# Patient Record
(demographics unavailable — no encounter records)

---

## 2025-02-19 NOTE — HISTORY OF PRESENT ILLNESS
[de-identified] : 02/19/2025: Patient is a 67-year-old female presenting for evaluation of left knee pain that started on 2/8/2025 after a dog hit to the lateral aspect of her left knee.  She had an immediate pain and difficulty weightbearing.  She has been limping since.  She has some pain radiate up the lateral aspect of her thigh and down into the lateral aspect of her lower leg.  She been taking Tylenol and ibuprofen for pain.  Ibuprofen gives her stomach issues so she has not been able to take it much.  She denies any previous history of injury to this knee in the past.  She denies any numbness or tingling.  She does have some mild weakness that she is noticed.  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

## 2025-02-19 NOTE — ASSESSMENT
[FreeTextEntry1] : Left knee injury on 2/8/2025 after getting hit on the lateral aspect of the knee by a dog.  She has pain over the proximal fibula and lateral joint line with negative x-rays.  However, there is concern for a nondisplaced fibular head fracture given the location of pain and swelling over the proximal fibula. - MRI left knee for further evaluation of bony injury and underlying lateral meniscus injury - Recommended hinged knee brace locked in extension for the next 2 weeks.  DME prescription added - Okay to use Tylenol and ice as needed for pain - Recommended that she not go on her business trip to [place] this weekend since she would need to keep the leg locked in extension and this would be challenging for work conference airplane travel - Follow-up after MRI

## 2025-02-19 NOTE — PHYSICAL EXAM
[de-identified] : Constitutional: Well developed, well nourished, able to communicate Neuro: Normal sensation, No focal deficits Skin: Intact CV: Peripheral vascular exam grossly normal Pulm: No signs of respiratory distress Psych: Oriented, normal mood and affect  L knee: - No obvious deformity or swelling - Pain over the lateral joint line and over the proximal fibula laterally and posteriorly with some boggy edema noted - No pain with palpation of medial line. - Pain over the pes anserine bursa - ROM from 125 degrees of flexion to 0 degrees extension.  Pain in full extension and full flexion -4/5 strength with knee flexion and extension - Stable varus, valgus, Lachman's, posterior drawer testing -Pain with Abelardo's - Negative patellar grind - Distally neurovascularly intact.     R knee: - No obvious deformity or swelling - No pain with palpation of medial or lateral joint line. - ROM from 135 degrees of flexion to 0 degrees extension. - 5/5 strength with knee flexion and extension - Distally neurovascularly intact. [de-identified] : 3 views left knee XR with mild degenerative medial changes.  No obvious fracture or dislocation

## 2025-02-25 NOTE — HISTORY OF PRESENT ILLNESS
[de-identified] : 02/25/2025: Presenting for follow-up of left knee injury after completing MRI and obtaining hinged knee brace.  She states that the pain has lessened and is more tolerable.  She is not sure if she is wearing the brace right.  She denies any new injuries  02/19/2025: Patient is a 67-year-old female presenting for evaluation of left knee pain that started on 2/8/2025 after a dog hit to the lateral aspect of her left knee.  She had an immediate pain and difficulty weightbearing.  She has been limping since.  She has some pain radiate up the lateral aspect of her thigh and down into the lateral aspect of her lower leg.  She been taking Tylenol and ibuprofen for pain.  Ibuprofen gives her stomach issues so she has not been able to take it much.  She denies any previous history of injury to this knee in the past.  She denies any numbness or tingling.  She does have some mild weakness that she is noticed.  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

## 2025-02-25 NOTE — ASSESSMENT
[FreeTextEntry1] : Left knee injury on 2/8/2025 after getting hit on the lateral aspect of the knee by a dog.  She has pain over the proximal fibula and lateral joint line with negative x-rays.  MRI without read but images personally reviewed showing subchondral injury to the lateral femoral condyle and lateral tibial plateau without overt findings of cortical disruption.  Mild joint effusion.  Mild degenerative meniscal changes.  Ligaments appear intact. - Recommend continuing to wear hinged knee brace locked in extension for another week.  She may then open to 45 degrees as tolerated for 3 to 4 days.  She may then open up to 90 degrees for 3 to 4 days as tolerated.  She may then remove the brace if the knee is feeling well - Physical therapy referral to start in a week - Continue use Tylenol, ibuprofen, ice as needed for pain - Will plan to follow-up in 2 weeks

## 2025-02-25 NOTE — PHYSICAL EXAM
[de-identified] : Constitutional: Well developed, well nourished, able to communicate Neuro: Normal sensation, No focal deficits Skin: Intact CV: Peripheral vascular exam grossly normal Pulm: No signs of respiratory distress Psych: Oriented, normal mood and affect  L knee: - No obvious deformity or swelling - Pain over the lateral joint line and over the proximal fibula laterally and posteriorly with some boggy edema noted - No pain with palpation of medial line. - Pain over the pes anserine bursa - ROM from 125 degrees of flexion to 0 degrees extension.  Pain in full extension and full flexion -4/5 strength with knee flexion and extension - Stable varus, valgus, Lachman's, posterior drawer testing -Pain with Abelardo's - Negative patellar grind - Distally neurovascularly intact.     R knee: - No obvious deformity or swelling - No pain with palpation of medial or lateral joint line. - ROM from 135 degrees of flexion to 0 degrees extension. - 5/5 strength with knee flexion and extension - Distally neurovascularly intact. [de-identified] : 3 views left knee XR with mild degenerative medial changes.  No obvious fracture or dislocation

## 2025-03-11 NOTE — ASSESSMENT
[FreeTextEntry1] : Left knee injury on 2/8/2025 after getting hit on the lateral aspect of the knee by a dog.  She has pain over the proximal fibula and lateral joint line with negative x-rays.  MRI without read but images personally reviewed showing subchondral injury to the lateral femoral condyle and lateral tibial plateau without overt findings of cortical disruption.  Mild joint effusion.  Mild degenerative meniscal changes.  Ligaments appear intact. Improved with brace and PT - Can come out of brace - Continue PT - Continue use Tylenol, ibuprofen, ice as needed for pain - Will plan to follow-up 3-4 weeks

## 2025-03-11 NOTE — PHYSICAL EXAM
[de-identified] : Constitutional: Well developed, well nourished, able to communicate Neuro: Normal sensation, No focal deficits Skin: Intact CV: Peripheral vascular exam grossly normal Pulm: No signs of respiratory distress Psych: Oriented, normal mood and affect  L knee: - No obvious deformity or swelling - Mild Pain over the lateral joint line and over the proximal fibula laterally and posteriorly with out boggy edema noted - No pain with palpation of medial line. - Mild Pain over the pes anserine bursa - ROM from 125 degrees of flexion to 0 degrees extension.  no Pain in full extension and full flexion -5/5 strength with knee flexion and extension - Stable varus, valgus, Lachman's, posterior drawer testing - Negative patellar grind - Distally neurovascularly intact.     R knee: - No obvious deformity or swelling - No pain with palpation of medial or lateral joint line. - ROM from 135 degrees of flexion to 0 degrees extension. - 5/5 strength with knee flexion and extension - Distally neurovascularly intact. [de-identified] : 3 views left knee XR with mild degenerative medial changes.  No obvious fracture or dislocation

## 2025-03-11 NOTE — HISTORY OF PRESENT ILLNESS
[de-identified] : 03/11/2025: Presenting for follow up of left knee pain. Since last visit, she has improved pain and has been able to open her brace to 90 degrees. She has been in PT. She denies any new injuries.   02/25/2025: Presenting for follow-up of left knee injury after completing MRI and obtaining hinged knee brace.  She states that the pain has lessened and is more tolerable.  She is not sure if she is wearing the brace right.  She denies any new injuries  02/19/2025: Patient is a 67-year-old female presenting for evaluation of left knee pain that started on 2/8/2025 after a dog hit to the lateral aspect of her left knee.  She had an immediate pain and difficulty weightbearing.  She has been limping since.  She has some pain radiate up the lateral aspect of her thigh and down into the lateral aspect of her lower leg.  She been taking Tylenol and ibuprofen for pain.  Ibuprofen gives her stomach issues so she has not been able to take it much.  She denies any previous history of injury to this knee in the past.  She denies any numbness or tingling.  She does have some mild weakness that she is noticed.  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

## 2025-03-11 NOTE — HISTORY OF PRESENT ILLNESS
[de-identified] : 03/11/2025: Presenting for follow up of left knee pain. Since last visit, she has improved pain and has been able to open her brace to 90 degrees. She has been in PT. She denies any new injuries.   02/25/2025: Presenting for follow-up of left knee injury after completing MRI and obtaining hinged knee brace.  She states that the pain has lessened and is more tolerable.  She is not sure if she is wearing the brace right.  She denies any new injuries  02/19/2025: Patient is a 67-year-old female presenting for evaluation of left knee pain that started on 2/8/2025 after a dog hit to the lateral aspect of her left knee.  She had an immediate pain and difficulty weightbearing.  She has been limping since.  She has some pain radiate up the lateral aspect of her thigh and down into the lateral aspect of her lower leg.  She been taking Tylenol and ibuprofen for pain.  Ibuprofen gives her stomach issues so she has not been able to take it much.  She denies any previous history of injury to this knee in the past.  She denies any numbness or tingling.  She does have some mild weakness that she is noticed.  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

## 2025-03-11 NOTE — PHYSICAL EXAM
[de-identified] : Constitutional: Well developed, well nourished, able to communicate Neuro: Normal sensation, No focal deficits Skin: Intact CV: Peripheral vascular exam grossly normal Pulm: No signs of respiratory distress Psych: Oriented, normal mood and affect  L knee: - No obvious deformity or swelling - Mild Pain over the lateral joint line and over the proximal fibula laterally and posteriorly with out boggy edema noted - No pain with palpation of medial line. - Mild Pain over the pes anserine bursa - ROM from 125 degrees of flexion to 0 degrees extension.  no Pain in full extension and full flexion -5/5 strength with knee flexion and extension - Stable varus, valgus, Lachman's, posterior drawer testing - Negative patellar grind - Distally neurovascularly intact.     R knee: - No obvious deformity or swelling - No pain with palpation of medial or lateral joint line. - ROM from 135 degrees of flexion to 0 degrees extension. - 5/5 strength with knee flexion and extension - Distally neurovascularly intact. [de-identified] : 3 views left knee XR with mild degenerative medial changes.  No obvious fracture or dislocation

## 2025-04-26 NOTE — REVIEW OF SYSTEMS
Addendum  created 04/26/25 0932 by Edward Sosa MD    Clinical Note Signed       [Arthralgia] : arthralgia [Joint Pain] : joint pain [Joint Stiffness] : joint stiffness [Negative] : Integumentary [Joint Swelling] : no joint swelling

## 2025-05-13 NOTE — PHYSICAL EXAM
[de-identified] : Constitutional: Well developed, well nourished, able to communicate Neuro: Normal sensation, No focal deficits Skin: Intact CV: Peripheral vascular exam grossly normal Pulm: No signs of respiratory distress Psych: Oriented, normal mood and affect  L knee: - No obvious deformity or swelling - No pain over the lateral joint line or proximal fibula with resolved edema - Pain over the distal IT band above the level of the joint space - No pain over the Pez anserine bursa - No pain over the medial joint line - ROM from 135 of flexion to 0 degrees extension without pain - 5/5 strength with knee flexion and extension - Stable varus, valgus, Lachman's, posterior drawer testing - Negative patellar grind - Distally neurovascularly intact.     R knee: - No obvious deformity or swelling - No pain with palpation of medial or lateral joint line. - ROM from 135 degrees of flexion to 0 degrees extension. - 5/5 strength with knee flexion and extension - Distally neurovascularly intact.

## 2025-05-13 NOTE — ASSESSMENT
[FreeTextEntry1] : Left knee injury on 2/8/2025 after getting hit on the lateral aspect of the knee by a dog.  She has pain over the proximal fibula and lateral joint line with negative x-rays.  MRI without read but images personally reviewed showing subchondral injury to the lateral femoral condyle and lateral tibial plateau without overt findings of cortical disruption.  Mild joint effusion.  Mild degenerative meniscal changes.  Ligaments appear intact. Improved with brace and PT..  Finishing off physical therapy now with some distal IT band pain as well - HEP given for IT band stretching.  Continue HEP from physical therapy as well - Can return to physical activities as tolerated - Follow-up as needed

## 2025-05-13 NOTE — HISTORY OF PRESENT ILLNESS
[de-identified] : 05/13/2025: Presenting for follow-up of left knee pain.  She states that she is almost finished with physical therapy.  She has been back to walking 3 miles a day.  She feels some lateral knee/thigh pain different from before.  No new injuries  03/11/2025: Presenting for follow up of left knee pain. Since last visit, she has improved pain and has been able to open her brace to 90 degrees. She has been in PT. She denies any new injuries.   02/25/2025: Presenting for follow-up of left knee injury after completing MRI and obtaining hinged knee brace.  She states that the pain has lessened and is more tolerable.  She is not sure if she is wearing the brace right.  She denies any new injuries  02/19/2025: Patient is a 67-year-old female presenting for evaluation of left knee pain that started on 2/8/2025 after a dog hit to the lateral aspect of her left knee.  She had an immediate pain and difficulty weightbearing.  She has been limping since.  She has some pain radiate up the lateral aspect of her thigh and down into the lateral aspect of her lower leg.  She been taking Tylenol and ibuprofen for pain.  Ibuprofen gives her stomach issues so she has not been able to take it much.  She denies any previous history of injury to this knee in the past.  She denies any numbness or tingling.  She does have some mild weakness that she is noticed.  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